# Patient Record
Sex: FEMALE | Race: OTHER | Employment: UNEMPLOYED | URBAN - METROPOLITAN AREA
[De-identification: names, ages, dates, MRNs, and addresses within clinical notes are randomized per-mention and may not be internally consistent; named-entity substitution may affect disease eponyms.]

---

## 2017-02-04 ENCOUNTER — TELEPHONE (OUTPATIENT)
Dept: NEUROLOGY | Facility: CLINIC | Age: 62
End: 2017-02-04

## 2017-02-04 NOTE — PATIENT INSTRUCTIONS
Refill policies:    • Allow 2 business days for refills; controlled substances may take longer. • Contact our office at least 5 days prior to running out of medication or submit request through the “request refill” option in your [facility] [employer] account.   • Ref have a procedure or additional testing performed. [facility]) will contact your insurance carrier to obtain pre-certification or prior authorization.     Unfortunately, [facility] has seen an increase in denial of payment even though the p

## 2017-02-04 NOTE — PROGRESS NOTES
[facility]    Munir Butts is a 57year old female. HPI:     Patient is being seen in follow-up. She is accompanied by her daughters to the visit today. I saw her last 9/2/16. She continues to live in [place].   Her daughter i HPI      PHYSICAL EXAM:     Vitals:  /76 mmHg  Pulse 76  Ht 60\"  Wt 160 lb  BMI 31.25 kg/m2  SpO2 98%   General: no apparent distress, pleasant and cooperative    Neuro:  Mental Status: alert, speech fluent but somewhat tangential; mood is euthymic

## 2017-02-15 NOTE — TELEPHONE ENCOUNTER
S/w Christine Fried, she advised order for HHA to help pt w/ medications etc..  Christine Fried also mentioned patient may benefit from adult day care, and if this can be reinforced to patient and family at next appt. Aníbal Ochoa HHA order placed.

## 2017-02-16 NOTE — TELEPHONE ENCOUNTER
Called Ms Jocelin Garrett [phone] to inform of the referral that Dr Florecita Rayo has placed, L/M to c/b with any questions or concerns

## 2017-03-02 ENCOUNTER — TELEPHONE (OUTPATIENT)
Dept: NEUROLOGY | Facility: CLINIC | Age: 62
End: 2017-03-02

## 2017-05-17 ENCOUNTER — TELEPHONE (OUTPATIENT)
Dept: NEUROLOGY | Facility: CLINIC | Age: 62
End: 2017-05-17

## 2017-05-17 NOTE — TELEPHONE ENCOUNTER
Last visit with Dr. Lucila Kraus was 2/04/17. Daughter, Rafal Handley, reports that patient had seen her PCP Dr. García Gonsalves approx. 2 weeks ago for diabetes and high blood pressure.  Patient was then referred to a diabetes education class at [facility], an

## 2017-07-17 NOTE — TELEPHONE ENCOUNTER
Called [facility]. They did not receive aricept 5 mg on 2/4. Prescription called into pharmacy.
no loss of consciousness, no gait abnormality, no headache, no sensory deficits, and no weakness.

## 2017-08-18 NOTE — PROGRESS NOTES
[facility]    Mateo Hill is a 60year old female. HPI:     Patient is being seen in follow-up. She is accompanied by her daughter to the visit today. I saw her last 2/4/17.       Since last visit, she has actually relocated wheezing or cough   CARDIOVASCULAR: denies chest pain or palpitations   MUSCULOSKELETAL: no muscle pain, no muscle weakness    PSYCH: see HPI  NEURO: see HPI      PHYSICAL EXAM:     Vitals:  BP (!) 150/102 (BP Location: Right arm, Patient Position: Sitting

## 2018-05-09 ENCOUNTER — CHARTING TRANS (OUTPATIENT)
Dept: OTHER | Age: 63
End: 2018-05-09

## 2018-11-21 ENCOUNTER — LAB SERVICES (OUTPATIENT)
Dept: OTHER | Age: 63
End: 2018-11-21

## 2018-11-21 ENCOUNTER — CHARTING TRANS (OUTPATIENT)
Dept: OTHER | Age: 63
End: 2018-11-21

## 2018-11-22 LAB
CULTURE STREP GRP A (STTH) HL: ABNORMAL

## 2018-12-08 VITALS
RESPIRATION RATE: 16 BRPM | WEIGHT: 164.99 LBS | BODY MASS INDEX: 32.39 KG/M2 | HEIGHT: 60 IN | HEART RATE: 88 BPM | TEMPERATURE: 98.1 F | DIASTOLIC BLOOD PRESSURE: 78 MMHG | SYSTOLIC BLOOD PRESSURE: 118 MMHG

## 2018-12-24 ENCOUNTER — TELEPHONE (OUTPATIENT)
Dept: SCHEDULING | Age: 63
End: 2018-12-24

## 2019-02-14 RX ORDER — AMOXICILLIN 500 MG/1
CAPSULE ORAL
Qty: 20 CAPSULE | Refills: 0 | OUTPATIENT
Start: 2019-02-14

## 2019-02-17 ENCOUNTER — APPOINTMENT (OUTPATIENT)
Dept: URGENT CARE | Age: 64
End: 2019-02-17

## 2019-11-21 ENCOUNTER — WALK IN (OUTPATIENT)
Dept: URGENT CARE | Age: 64
End: 2019-11-21

## 2019-11-21 VITALS
WEIGHT: 157 LBS | RESPIRATION RATE: 16 BRPM | HEART RATE: 86 BPM | SYSTOLIC BLOOD PRESSURE: 118 MMHG | OXYGEN SATURATION: 98 % | DIASTOLIC BLOOD PRESSURE: 78 MMHG | HEIGHT: 60 IN | TEMPERATURE: 97.8 F | BODY MASS INDEX: 30.82 KG/M2

## 2019-11-21 DIAGNOSIS — J06.9 UPPER RESPIRATORY TRACT INFECTION, UNSPECIFIED TYPE: ICD-10-CM

## 2019-11-21 DIAGNOSIS — J02.9 SORE THROAT: Primary | ICD-10-CM

## 2019-11-21 LAB
INTERNAL PROCEDURAL CONTROLS ACCEPTABLE: YES
S PYO AG THROAT QL IA.RAPID: NEGATIVE

## 2019-11-21 PROCEDURE — 99213 OFFICE O/P EST LOW 20 MIN: CPT | Performed by: NURSE PRACTITIONER

## 2019-11-21 PROCEDURE — 87880 STREP A ASSAY W/OPTIC: CPT | Performed by: NURSE PRACTITIONER

## 2019-11-21 RX ORDER — AMLODIPINE BESYLATE 10 MG/1
TABLET ORAL
Refills: 0 | COMMUNITY
Start: 2019-10-25

## 2019-11-21 RX ORDER — HYDROCHLOROTHIAZIDE 25 MG/1
25 TABLET ORAL
COMMUNITY

## 2019-11-21 RX ORDER — ASPIRIN 325 MG
325 TABLET ORAL DAILY
COMMUNITY

## 2019-11-21 RX ORDER — LISINOPRIL 40 MG/1
TABLET ORAL
Refills: 1 | COMMUNITY
Start: 2019-10-25

## 2019-11-21 RX ORDER — ATORVASTATIN CALCIUM 40 MG/1
TABLET, FILM COATED ORAL
Refills: 0 | COMMUNITY
Start: 2019-11-08

## 2019-11-21 RX ORDER — INSULIN LISPRO 100 [IU]/ML
INJECTION, SOLUTION INTRAVENOUS; SUBCUTANEOUS
Refills: 0 | COMMUNITY
Start: 2019-09-10

## 2019-11-21 ASSESSMENT — ENCOUNTER SYMPTOMS
SINUS PAIN: 0
COUGH: 1
GASTROINTESTINAL NEGATIVE: 1
DIAPHORESIS: 0
SORE THROAT: 1
SINUS PRESSURE: 0
FEVER: 0
RHINORRHEA: 1
NEUROLOGICAL NEGATIVE: 1
EYES NEGATIVE: 1
CHILLS: 0
FACIAL SWELLING: 0
FATIGUE: 0
TROUBLE SWALLOWING: 0
ALLERGIC/IMMUNOLOGIC NEGATIVE: 1

## 2020-06-24 ENCOUNTER — TELEPHONE (OUTPATIENT)
Dept: NEUROLOGY | Facility: CLINIC | Age: 65
End: 2020-06-24

## 2020-06-24 NOTE — PROGRESS NOTES
Neurology Video / Virtual Follow-up Note    Brodie Ramirez is a 76year old female. HPI:     Patient being seen in follow-up. I have not seen her in the office since February 2017. Since last visit, she did unfortunately have a stroke in February 2019. • Donepezil HCl 5 MG Oral Tab Take 1 tablet (5 mg total) by mouth nightly. 30 tablet 3   • glipiZIDE 10 MG Oral Tab Take 10 mg by mouth 2 (two) times daily before meals.      • MetFORMIN HCl 1000 MG Oral Tab Take 1,000 mg by mouth 2 (two) times daily with by saline contrast.      EKG  Normal sinus rhythm  Normal ECG  When compared with ECG of 16-MAY-2017 16:36,  No significant change was found    MRI brain   IMPRESSION:  Acute lacunar infarction involving the left ventral and medial mid rakesh.  Chronic small emphysematous change or less likely edema. 6. Heterogeneous thyroid gland with nodules measuring up to about 8 mm. While statistically likely benign, neoplasm cannot be excluded.  In the future I would recommend a follow-up outpatient thyroid ultrasound to understands and accepts financial responsibility for any deductible, co-insurance and/or co-pays associated with this service.

## 2021-03-08 DIAGNOSIS — Z23 NEED FOR VACCINATION: ICD-10-CM

## (undated) NOTE — MR AVS SNAPSHOT
[facility] [employer] [facility]  [address], [address] [facility]  [address] [facility] [phone]               Thank you for choosing us for your health care visit with Berry Clayton MD.  We are glad to serve you and happy to provide you with this summary of your of individual in advance and they must present an ID as well. ? The name of the person picking up your prescription must be documented in your chart.   Scheduling Tests    If your physician has ordered radiology tests such as MRI or CT scans, do not schedu Donepezil HCl 5 MG Tabs   Take 1 tablet (5 mg total) by mouth nightly. Commonly known as:  ARICEPT           glipiZIDE 10 MG Tabs   Take 10 mg by mouth 2 (two) times daily before meals.    Commonly known as:  GLUCOTROL           MetFORMIN HCl 1000 MG Tab increments are effective and add up over the week   2 ½ hours per week – spread out over time Use a bubba to keep you motivated   Don’t forget strength training with weights and resistance Set goals and track your progress   You don’t need to join a gym.